# Patient Record
Sex: MALE | Race: WHITE | Employment: FULL TIME | URBAN - NONMETROPOLITAN AREA
[De-identification: names, ages, dates, MRNs, and addresses within clinical notes are randomized per-mention and may not be internally consistent; named-entity substitution may affect disease eponyms.]

---

## 2022-05-10 ENCOUNTER — HOSPITAL ENCOUNTER (EMERGENCY)
Age: 32
Discharge: HOME | End: 2022-05-10
Payer: COMMERCIAL

## 2022-05-10 VITALS
HEART RATE: 120 BPM | TEMPERATURE: 100.9 F | OXYGEN SATURATION: 97 % | RESPIRATION RATE: 16 BRPM | WEIGHT: 295 LBS | DIASTOLIC BLOOD PRESSURE: 89 MMHG | SYSTOLIC BLOOD PRESSURE: 134 MMHG

## 2022-05-10 DIAGNOSIS — J06.9 UPPER RESPIRATORY TRACT INFECTION DUE TO COVID-19 VIRUS: Primary | ICD-10-CM

## 2022-05-10 DIAGNOSIS — U07.1 UPPER RESPIRATORY TRACT INFECTION DUE TO COVID-19 VIRUS: Primary | ICD-10-CM

## 2022-05-10 LAB
FLU A ANTIGEN: NEGATIVE
INFLUENZA B AG, EIA: NEGATIVE
SARS-COV-2, NAA: DETECTED

## 2022-05-10 PROCEDURE — 87635 SARS-COV-2 COVID-19 AMP PRB: CPT

## 2022-05-10 PROCEDURE — 99202 OFFICE O/P NEW SF 15 MIN: CPT | Performed by: NURSE PRACTITIONER

## 2022-05-10 PROCEDURE — 87804 INFLUENZA ASSAY W/OPTIC: CPT

## 2022-05-10 PROCEDURE — 99203 OFFICE O/P NEW LOW 30 MIN: CPT

## 2022-05-10 RX ORDER — GUAIFENESIN AND CODEINE PHOSPHATE 100; 10 MG/5ML; MG/5ML
5 SOLUTION ORAL 3 TIMES DAILY PRN
Qty: 60 ML | Refills: 0 | Status: SHIPPED | OUTPATIENT
Start: 2022-05-10 | End: 2022-05-13

## 2022-05-10 RX ORDER — OMEPRAZOLE 10 MG/1
10 CAPSULE, DELAYED RELEASE ORAL DAILY
COMMUNITY

## 2022-05-10 RX ORDER — ALBUTEROL SULFATE 90 UG/1
2 AEROSOL, METERED RESPIRATORY (INHALATION) EVERY 6 HOURS PRN
Qty: 18 G | Refills: 0 | Status: SHIPPED | OUTPATIENT
Start: 2022-05-10

## 2022-05-10 RX ORDER — ACETAMINOPHEN 500 MG
1000 TABLET ORAL EVERY 6 HOURS PRN
COMMUNITY

## 2022-05-10 RX ORDER — CETIRIZINE HYDROCHLORIDE 10 MG/1
10 TABLET ORAL DAILY
Qty: 30 TABLET | Refills: 0 | Status: SHIPPED | OUTPATIENT
Start: 2022-05-10 | End: 2022-06-09

## 2022-05-10 RX ORDER — AZELASTINE 1 MG/ML
1 SPRAY, METERED NASAL 2 TIMES DAILY
Qty: 30 ML | Refills: 0 | Status: SHIPPED | OUTPATIENT
Start: 2022-05-10

## 2022-05-10 RX ORDER — DEXAMETHASONE 6 MG/1
6 TABLET ORAL 2 TIMES DAILY WITH MEALS
Qty: 20 TABLET | Refills: 0 | Status: SHIPPED | OUTPATIENT
Start: 2022-05-10 | End: 2022-05-20

## 2022-05-10 ASSESSMENT — PAIN DESCRIPTION - LOCATION: LOCATION: HEAD

## 2022-05-10 ASSESSMENT — PAIN SCALES - GENERAL: PAINLEVEL_OUTOF10: 2

## 2022-05-10 ASSESSMENT — PAIN - FUNCTIONAL ASSESSMENT: PAIN_FUNCTIONAL_ASSESSMENT: 0-10

## 2022-05-10 NOTE — ED TRIAGE NOTES
Patient states works at [employer], symptoms started yesterday, productive cough, green mucus, fever, body aches, diarrhea, nausea. Wants tested for covid and flu. Nasal swabs obtained, labeled, taken to lab, tolerated well.

## 2022-05-11 ENCOUNTER — CARE COORDINATION (OUTPATIENT)
Dept: OTHER | Facility: CLINIC | Age: 32
End: 2022-05-11

## 2022-05-11 NOTE — CARE COORDINATION
Patient contacted regarding COVID-19 diagnosis. Discussed COVID-19 related testing which was available at this time. Test results were positive. Patient informed of results, if available? Yes. Ambulatory Care Manager contacted the patient by telephone to perform post discharge assessment. Call within 2 business days of discharge: Yes. Verified name and  with patient as identifiers. Provided introduction to self, and explanation of the CTN/ACM role, and reason for call due to risk factors for infection and/or exposure to COVID-19. Symptoms reviewed with patient who verbalized the following symptoms: fatigue  cough  nausea  dizziness/lightheadedness  sinus congestion. Patient states he is feeling a little better today. He states he felt miserable last night. He states he has a cough, congestion and occasional nausea. He does have a Pulse Ox and discussed self monitoring, Pulse ox goal ranges, and Red Flag symptoms to report. Patient states he is to be off work for the next 5 days and has contacted his manager. He has not contact Associate services and ACM provided Associate Services contact information. Also discussed setting up an [employer] account online. Will mail list of [facility] PCPs that are close to him to contact to establish. Pt verbalized understanding and is agreeable. Plan of Care and Education:  Red Flag Symptoms to report  Infection prevention measures: proper hand hygiene, disinfection of shared surfaces, social distancing, mask as directed  Has plan to quarantine as directed  Able to self monitor: Yes- Pulse Ox  Barriers to food or supplies: None  Has support at home: Yes      Due to no new or worsening symptoms encounter was not routed to provider for escalation. Discussed follow-up appointments.  If no appointment was previously scheduled, appointment scheduling offered: Yes, patient declined as he has no PCP and states was not told to follow up  Provided Vsc[employer]xly14/ access info in the meantime. [facility] follow up appointment(s): No future appointments. [facility]): None    Non-face-to-face services provided:  Obtained and reviewed discharge summary and/or continuity of care documents  Education of patient/family/caregiver/guardian to support self-management-Pulse Ox self monitoring and goal ranges, Red flag symptoms to report  Assessment and support for treatment adherence and medication management-Kulwinder has medications filled and taking as direted and following Discharge instructions. Advance Care Planning:   Does patient have an Advance Directive:  not on file. Educated patient about risk for severe COVID-19 due to risk factors according to CDC guidelines. ACM reviewed discharge instructions, medical action plan and red flag symptoms with the patient who verbalized understanding. Discussed COVID vaccination status: Yes. Education provided on COVID-19 vaccination as appropriate. Discussed exposure protocols and quarantine with CDC Guidelines. Patient was given an opportunity to verbalize any questions and concerns and agrees to contact ACM or health care provider for questions related to their healthcare. Reviewed and educated patient on any new and changed medications related to discharge diagnosis     Was patient discharged with a pulse oximeter? Yes Discussed and confirmed pulse oximeter discharge instructions and when to notify provider or seek emergency care. ACM provided contact information. Plan for follow-up call in 3-5 days based on severity of symptoms and risk factors.

## 2022-05-11 NOTE — ED NOTES
Patient understood instructions verbally,  Follow up with PCP with any concerns, work excuse with patient. Worse symptoms follow up with ED.ambulated self to lobby,stable condition. All belongings with patient.      Owen Falcon, LAURA  31/24/93 [id number]

## 2022-05-11 NOTE — LETTER
May 11, 2022    [facility]  [place] [address]      . [facility],[address] [place] [address] is dedicated to empowering the good health of its community and improving the quality of care and care experiences for our patients and their families. The COVID-19 pandemic has impacted all of us in different ways and [facility],[address] [facility] is here to support issues that may require services including: navigating your care and services; addressing high risk factors; providing care coordination; or providing social support. I would like to support you during this difficult and stressful time. The Associate Care Management (ACM) program is a free-of-charge service provided to our associates and their family members covered by the [facility]. We serve our ministry as an additional resource providing confidential assistance to symptomatic patients, and education to associates and family members who are at risk for complications due to GVWYA-61; especially for those over 61years of age, or have a chronic condition, such as heart disease, diabetes, or lung disease. Resources and Contacts related to [place] 19: Worsening symptoms?  Contact PCP office for questions related to your healthcare or schedule a MyChart E-Visit.  Complete a free COVID-19 related E-Visit with a [facility] provider at [email or web address]. Use the Coupon Code D1042523.  Contact the COVID-19 hotline [phone] with worsening symptoms that you feel cannot be managed at home.  Receive an in home provider visit using [place]. Available in [place], [place] and [place]. To Schedule contact [phone] or visit [email or web address]    Call 045 anytime you think you may need emergency care. For example, call if:  ·         You have severe trouble breathing. (You can't talk at all.)  ·         You have constant chest pain or pressure.   ·         You are severely dizzy or lightheaded. ·         You are confused or can't think clearly. ·         Your face and lips have a blue color. ·         You pass out (lose consciousness) or are very hard to wake up. COVID Questions? Contact the COVID-19 hotline [phone] and/or [facility] 94: ([phone]) for COVID 19 related questions. Associate Related Questions? Contact Associate Services for HR related questions: [phone]                                              SHERMAN Line: option 1 or [phone]                                              Associate Health Nurse: option 8    Additional Resources: You may be feeling anxious, depressed or overwhelmed. If you urgently need to talk or  text with someone, please reach out to the following resources:  Contact [employer] [phone] for links to local resources or support. Use code BSMH1. [email or web address]. [email or web address]     Call The Substance Abuse and Mental Health Services Administration's Disaster  Distress Helpline: 5-676-660-576  [facility] Line: 7-193.597.9711    Text  TalkWithUs to 62347 (TTY: 5-483.386.3861), or  TALK to 210430    Order free at home COVID-19 Test: [facility]    Contact your ACM, Silvia Guerrero RN for COVID-19 or other medical related questions: [phone].

## 2022-05-13 ENCOUNTER — CARE COORDINATION (OUTPATIENT)
Dept: OTHER | Facility: CLINIC | Age: 32
End: 2022-05-13

## 2022-05-13 ASSESSMENT — ENCOUNTER SYMPTOMS
SORE THROAT: 1
APNEA: 0
COUGH: 1
WHEEZING: 0
SHORTNESS OF BREATH: 0
RHINORRHEA: 1
DIARRHEA: 0
VOMITING: 0
CHEST TIGHTNESS: 0
SWOLLEN GLANDS: 0
SINUS PAIN: 1
CHOKING: 0
STRIDOR: 0
CONSTIPATION: 0
SINUS PRESSURE: 1
NAUSEA: 0
ABDOMINAL PAIN: 0

## 2022-05-13 NOTE — ED PROVIDER NOTES
[facility]  Ur[facility] Encounter      CHIEF COMPLAINT     No chief complaint on file. Nurses Notes reviewed and I agree except as noted in the HPI. HISTORY OFPRESENT ILLNESS   Ray Pel is a 33 y.o. The history is provided by the patient. No  was used. URI  Presenting symptoms: congestion, cough, fatigue, fever, rhinorrhea and sore throat    Presenting symptoms: no ear pain and no facial pain    Severity:  Moderate  Onset quality:  Sudden  Timing:  Constant  Progression:  Worsening  Chronicity:  New  Relieved by:  Nothing  Worsened by:  Certain positions and movement  Ineffective treatments:  OTC medications  Associated symptoms: headaches and sinus pain    Associated symptoms: no arthralgias, no myalgias, no neck pain, no sneezing, no swollen glands and no wheezing    Risk factors: not elderly, no chronic cardiac disease, no chronic kidney disease, no chronic respiratory disease, no diabetes mellitus, no immunosuppression, no recent illness, no recent travel and no sick contacts        REVIEW OF SYSTEMS     Review of Systems   Constitutional: Positive for fatigue and fever. Negative for activity change, appetite change, chills and diaphoresis. HENT: Positive for congestion, postnasal drip, rhinorrhea, sinus pressure, sinus pain and sore throat. Negative for ear pain and sneezing. Respiratory: Positive for cough. Negative for apnea, choking, chest tightness, shortness of breath, wheezing and stridor. Cardiovascular: Negative for chest pain, palpitations and leg swelling. Gastrointestinal: Negative for abdominal pain, constipation, diarrhea, nausea and vomiting. Musculoskeletal: Negative for arthralgias, myalgias and neck pain. Neurological: Positive for headaches. Negative for dizziness and light-headedness. PAST MEDICAL HISTORY   History reviewed. No pertinent past medical history.     SURGICAL HISTORY     Patient  has a past surgical history that includes Tonsillectomy. CURRENT MEDICATIONS       Discharge Medication List as of 5/10/2022  8:00 PM      CONTINUE these medications which have NOT CHANGED    Details   omeprazole (PRILOSEC) 10 MG delayed release capsule Take 10 mg by mouth dailyHistorical Med      acetaminophen (TYLENOL) 500 MG tablet Take 1,000 mg by mouth every 6 hours as needed for PainHistorical Med             ALLERGIES     Patient is has No Known Allergies. FAMILY HISTORY     Patient's family history includes Diabetes in his father; High Blood Pressure in his mother. SOCIAL HISTORY     Patient  reports that he has never smoked. He has never used smokeless tobacco. He reports current alcohol use. He reports that he does not use drugs. PHYSICAL EXAM     ED TRIAGE VITALS  BP: 134/89, Temp: 100.9 °F (38.3 °C), Pulse: 120, Resp: 16, SpO2: 97 %  Physical Exam  Vitals and nursing note reviewed. Constitutional:       General: He is not in acute distress. Appearance: Normal appearance. He is obese. He is not ill-appearing, toxic-appearing or diaphoretic. HENT:      Head: Normocephalic and atraumatic. Right Ear: Tympanic membrane, ear canal and external ear normal. There is no impacted cerumen. Left Ear: Tympanic membrane, ear canal and external ear normal. There is no impacted cerumen. Nose: Congestion and rhinorrhea present. Mouth/Throat:      Mouth: Mucous membranes are moist.      Pharynx: Oropharynx is clear. No oropharyngeal exudate or posterior oropharyngeal erythema. Eyes:      Extraocular Movements: Extraocular movements intact. Conjunctiva/sclera: Conjunctivae normal.   Pulmonary:      Effort: Pulmonary effort is normal. No respiratory distress. Breath sounds: Normal breath sounds. No stridor. No wheezing, rhonchi or rales. Chest:      Chest wall: No tenderness. Musculoskeletal:         General: Normal range of motion. Cervical back: Normal range of motion.    Skin: General: Skin is warm. Neurological:      General: No focal deficit present. Mental Status: He is alert and oriented to person, place, and time. Psychiatric:         Mood and Affect: Mood normal.         Behavior: Behavior normal.         Thought Content: Thought content normal.         Judgment: Judgment normal.         DIAGNOSTIC RESULTS   Labs:  Results for orders placed or performed during the hospital encounter of 05/10/22   COVID-19, Rapid   Result Value Ref Range    SARS-CoV-2, NALLELY DETECTED (AA) NOT DETECTED   Rapid influenza A/B antigens   Result Value Ref Range    Flu A Antigen Negative NEGATIVE    Influenza B Ag, EIA Negative NEGATIVE       IMAGING:  No orders to display     URGENT CARE COURSE:     Vitals:    05/10/22 1936   BP: 134/89   Pulse: 120   Resp: 16   Temp: 100.9 °F (38.3 °C)   TempSrc: Temporal   SpO2: 97%   Weight: 295 lb (133.8 kg)       Medications - No data to display  PROCEDURES:  None  FINAL IMPRESSION      1. Upper respiratory tract infection due to COVID-19 virus        DISPOSITION/PLAN   Decision To Discharge    Suggestions for symptom management that are available over the counter. If you have questions regarding allergies or contraindications to use, please speak to the pharmacy staff or your family provider.     For fevers or pain: acetaminophen (Tylenol), ibuprofen (Motrin)  For dry cough: medications containing dextromethorphan, such as Delsym, Robitussin DM or Mucinex DM and medicated throat lozenges  For congestion or sinus pressure: decongestant nasal sprays, such as Afrin (for up to 3 days), medications containing guaifenesin to help break up mucus, such as Mucinex or Robitussin, nasal steroid sprays, such as Flonase, Sensimist, Rhinocort or Nasonex and saline nasal sprays, neti pot or sinus rinse bottle  For runny nose, sneezing or watery/itchy eyes: less sedating antihistamines, such as loratidine (Claritin), fexofenadine (Allegra) or Cetirizine (Zyrtec) and antihistamine eye drops, such as ketotifen (Zaditor, Alaway) or olopatadine (Pataday)  For sore throat:  Chloraseptic throat spray or sore throat lozenges   If you have high blood pressure, a brand like Coricidin HBP may be an option. you should avoid medications containing pseudoephedrine or phenylephrine, such as Sudafed,  running a humidifier in your bedroom may be helpful for many of your symptoms. If your cough is keeping you awake at night, you can try raising your head with an extra pillow. If the skin around your nose and lips becomes sore, you can put some petroleum jelly on the area. PATIENT REFERRED TO:  [facility] K[address]ADAR [address] [id number]  Go today  If symptoms worsen    [facility]  [address] [place] [id number]  Call today  [phone]    DISCHARGE MEDICATIONS:  Discharge Medication List as of 5/10/2022  8:00 PM      START taking these medications    Details   albuterol sulfate  (90 Base) MCG/ACT inhaler Inhale 2 puffs into the lungs every 6 hours as needed for Wheezing, Disp-18 g, R-0Normal      guaiFENesin-codeine (TUSSI-ORGANIDIN NR) 100-10 MG/5ML syrup Take 5 mLs by mouth 3 times daily as needed for Cough for up to 3 days. , Disp-60 mL, R-0Normal      dexamethasone (DECADRON) 6 MG tablet Take 1 tablet by mouth 2 times daily (with meals) for 10 days, Disp-20 tablet, R-0Normal      azelastine (ASTELIN) 0.1 % nasal spray 1 spray by Nasal route 2 times daily Use in each nostril as directed, Disp-30 mL, R-0Normal      cetirizine (ZYRTEC ALLERGY) 10 MG tablet Take 1 tablet by mouth daily, Disp-30 tablet, R-0Normal           Discharge Medication List as of 5/10/2022  8:00 PM          Analisa Berry, [facility], APRN - CNP  05/13/22 9853

## 2022-05-13 NOTE — CARE COORDINATION
Patient contacted regarding COVID-19 diagnosis. Discussed COVID-19 related testing which was available at this time. Test results were positive. Patient informed of results, if available? Yes    Ambulatory Care Manager contacted the patient by telephone to perform follow-up assessment. Left HIPPA compliant voicemail requesting a call back. ACM provided contact information. Plan for follow-up call in 3-5 days based on severity of symptoms and risk factors.

## 2022-05-16 ENCOUNTER — CARE COORDINATION (OUTPATIENT)
Dept: OTHER | Facility: CLINIC | Age: 32
End: 2022-05-16

## 2022-05-16 NOTE — CARE COORDINATION
Patient contacted regarding COVID-19 diagnosis. Ambulatory Care Manager contacted the patient by telephone to perform follow-up assessment. Left HIPPA compliant message requesting a call back to discuss. ACM provided contact information. Plan for follow-up call in 5-7 days based on severity of symptoms and risk factors.

## 2022-05-24 ENCOUNTER — CARE COORDINATION (OUTPATIENT)
Dept: OTHER | Facility: CLINIC | Age: 32
End: 2022-05-24

## 2022-05-24 NOTE — CARE COORDINATION
Patient is resolved from the [address] Transitions episode on 5/24/22      Discussed COVID-19 related testing which was available at this time. Test results were positive. Patient informed of results, if available? Yes    Patient/family has been provided the following resources and education related to COVID-19:                         Signs, symptoms and red flags related to COVID-19            CDC exposure and quarantine guidelines            [facility] exposure contact - [phone]            Contact for their local Department of Health                 Attempted to reach patient by telephone.eft HIPPA compliant message requesting a return call. Will attempt to reach patient again. No further outreach scheduled with this ACM. Episode of Care resolved. Patient has this ACM contact information if future needs arise.